# Patient Record
(demographics unavailable — no encounter records)

---

## 2024-12-06 NOTE — ASSESSMENT
[FreeTextEntry1] : 1) Dyschromia, right inferior lower cheek, left superior eyelid Given morphology and hpi, strongly favor post-inflammatory hypopigmentation (consider p. alba), especially given history of AD and post-inflammatory hyperpigmention noted on anterior midline neck at prior site but given strong family history cannot r/o early vitiligo.  - New diagnosis with uncertain prognosis. - Lesions measured today as above. - We have discussed the nature and course. - We have discussed treatment options and proper expectations of treatment. - Start tacrolimus 0.03% ointment to affected areas BID. SED.  - Continue with gentle skincare and liberal Aquaphor use.  - RTC 8 weeks to f/u with Dr. Willis or Dr. Leyva.   2) Dyshidrotic eczema, bilateral hands Chronic, stable today 3) History of Eczematous dermatitis, L AC fossa, chronic, stable -I have discussed the nature and usual course with the patient. We have discussed treatment options, expectations from treatment, and associated side effects of topical therapies. - Recommend liberal emollient use with Vaseline/aquafor cream.  - limit handwashing, avoid use of antibacterial gels - While stable today, Pt reports that she feels betamethasone aug is not helpful. Will try switching to a different class 1 steroid.  - Switch to a different class 1- betamethasone aug >> clobetasol ointment TWICE DAILY, for no longer than 2 to 3 weeks at a time, SED   RTC 8 weeks as above

## 2024-12-06 NOTE — PHYSICAL EXAM
[Alert] : alert [Well Nourished] : well nourished [Conjunctiva Non-injected] : conjunctiva non-injected [No Visual Lymphadenopathy] : no visual  lymphadenopathy [No Clubbing] : no clubbing [No Edema] : no edema [No Bromhidrosis] : no bromhidrosis [No Chromhidrosis] : no chromhidrosis [FreeTextEntry3] : Focused skin exam performed  The relevant portions of the exam were performed today  AAOx3, NAD, well-appearing / pleasant Focused examination within normal limits with the exception of:  Well-demarcated 1.9 cm x 1.5 cm hypopigmented minimally scaly patch on the right inferior lower cheek. No fluorescence on Wood's lamp  Ill-defined hyperpigmented smooth patch on the midline anterior neck eczematous thin plaques with deep seated vesicles on R donnelly hand and few on L palm   ~ 1.5 cm x 1.7 cm hypopigmented minimally scaly patch on the left superior eyelid with lighter medial aspect and areas of repigmentation throughout. No fluorescence on Wood's lamp  Xerosis bilateral palms

## 2024-12-06 NOTE — HISTORY OF PRESENT ILLNESS
[FreeTextEntry1] : rpa: spots, eczema f/u [de-identified] : 15F last seen April 2023 by Dr. Willis, presenting today with Dad for   1) Light spot on her left eyelid and right cheek x mos. Lesion on the left eyelid started ~ 3 months ago. Always asymptomatic. Affected area has enlarged. No improvement with Aquaphor. Noticed a similar lesion on her neck that spread to her right cheek. Of note- original lesion on the left darkened. Only applying aquaphor. Using reportedly gentle facewash and moisturizer from [employer]. Wears mascara occasionally, denies other make-up. History of hyperthyroidism in Mom.  History of vitiligo in uncle.  2) F/u Rash on hands (R>L) for years, improved prev w/ betamethasone aug ointment. Not using regularly now and feels that it is spreading/worsening.  Occasionally moisturizes with Aquaphor- not regularly. Washes hands frequently, not using scented soaps/lotions.   FH - brother with eczema.

## 2025-01-31 NOTE — HISTORY OF PRESENT ILLNESS
[FreeTextEntry1] : followup  [de-identified] : last seen 12/2024  for hypopigmented patch on right jawline and left eyelid given tacrolimus to use daily

## 2025-04-25 NOTE — HISTORY OF PRESENT ILLNESS
[FreeTextEntry1] : followup  [de-identified] : last seen 1/31/2025 for hypopigmented patch on right jawline and left eyelid given tacrolimus to use daily - pt reports no improvement but no worsening of the jawline lesion. Eyelid lesion has improved. Both are asymptomatic.   Here with dad

## 2025-04-25 NOTE — PHYSICAL EXAM
[FreeTextEntry3] : Well-demarcated 1.9 cm x 1.5 cm hypopigmented minimally scaly patch on the right inferior lower cheek. No fluorescence on Wood's lamp

## 2025-04-25 NOTE — ASSESSMENT
[FreeTextEntry1] : 1) Dyschromia, right inferior lower cheek- chronic, not at treatment goal  ddx includes early vitiligo. Opzelura sample provided - proper use and side effects discussed. Can continue with tacrolimus strength to 0.1% when opzelura runs out  - Continue with gentle skincare and liberal Aquaphor use. - Educated on the importance on sunscreen   2) Eczema Chronic, not at treatment goal Recalcitrant to triamcinolone  Start opzelura - samples provided as above and rx sent  RTC 3-4 months

## 2025-04-25 NOTE — HISTORY OF PRESENT ILLNESS
[FreeTextEntry1] : followup  [de-identified] : last seen 1/31/2025 for hypopigmented patch on right jawline and left eyelid given tacrolimus to use daily - pt reports no improvement but no worsening of the jawline lesion. Eyelid lesion has improved. Both are asymptomatic.   Here with dad

## 2025-05-12 NOTE — HISTORY OF PRESENT ILLNESS
[Father] : father [Yes] : Patient goes to dentist yearly [Toothpaste] : Primary Fluoride Source: Toothpaste [Up to date] : Up to date [Normal] : normal [Eats meals with family] : eats meals with family [Grade: ____] : Grade: [unfilled] [Normal Performance] : normal performance [Eats regular meals including adequate fruits and vegetables] : eats regular meals including adequate fruits and vegetables [Drinks non-sweetened liquids] : drinks non-sweetened liquids  [Has friends] : has friends [At least 1 hour of physical activity a day] : at least 1 hour of physical activity a day [Screen time (except homework) less than 2 hours a day] : no screen time (except homework) less than 2 hours a day [Uses electronic nicotine delivery system] : does not use electronic nicotine delivery system [Uses tobacco] : does not use tobacco [Uses drugs] : does not use drugs  [Drinks alcohol] : does not drink alcohol [No] : Patient has not had sexual intercourse. [With Teen] : teen [With Parent/Guardian] : parent/guardian

## 2025-05-12 NOTE — DISCUSSION/SUMMARY
[Physical Growth and Development] : physical growth and development [Social and Academic Competence] : social and academic competence [Emotional Well-Being] : emotional well-being [Risk Reduction] : risk reduction [Violence and Injury Prevention] : violence and injury prevention [Father] : father [FreeTextEntry1] :  15 year female growing and developing well.  Continue balanced diet with all food groups. Brush teeth twice a day with toothbrush. Recommend visit to dentist. Maintain consistent daily routines and sleep schedule. Personal hygiene, puberty, and sexual health reviewed. Risky behaviors assessed. School discussed. Limit screen time to no more than 2 hours per day. Encourage physical activity. Return 1 year for routine well child check.  Blood draw performed in office by MA.

## 2025-05-12 NOTE — PHYSICAL EXAM
